# Patient Record
Sex: MALE | Race: OTHER
[De-identification: names, ages, dates, MRNs, and addresses within clinical notes are randomized per-mention and may not be internally consistent; named-entity substitution may affect disease eponyms.]

---

## 2019-12-19 ENCOUNTER — HOSPITAL ENCOUNTER (INPATIENT)
Dept: HOSPITAL 12 - ER | Age: 29
LOS: 1 days | Discharge: HOME | DRG: 253 | End: 2019-12-20
Admitting: INTERNAL MEDICINE
Payer: MEDICAID

## 2019-12-19 VITALS — HEIGHT: 75 IN | WEIGHT: 215.13 LBS | BODY MASS INDEX: 26.75 KG/M2

## 2019-12-19 DIAGNOSIS — K59.00: ICD-10-CM

## 2019-12-19 DIAGNOSIS — K92.2: Primary | ICD-10-CM

## 2019-12-19 DIAGNOSIS — R74.0: ICD-10-CM

## 2019-12-19 DIAGNOSIS — K21.9: ICD-10-CM

## 2019-12-19 LAB
ALP SERPL-CCNC: 113 U/L (ref 50–136)
ALT SERPL W/O P-5'-P-CCNC: 88 U/L (ref 16–63)
AST SERPL-CCNC: 50 U/L (ref 15–37)
BASOPHILS # BLD AUTO: 0.1 K/UL (ref 0–8)
BASOPHILS NFR BLD AUTO: 0.8 % (ref 0–2)
BILIRUB DIRECT SERPL-MCNC: 0.1 MG/DL (ref 0–0.2)
BILIRUB SERPL-MCNC: 0.4 MG/DL (ref 0.2–1)
BUN SERPL-MCNC: 18 MG/DL (ref 7–18)
CHLORIDE SERPL-SCNC: 103 MMOL/L (ref 98–107)
CO2 SERPL-SCNC: 27 MMOL/L (ref 21–32)
CREAT SERPL-MCNC: 1.2 MG/DL (ref 0.6–1.3)
EOSINOPHIL # BLD AUTO: 0.1 K/UL (ref 0–0.7)
EOSINOPHIL NFR BLD AUTO: 1.3 % (ref 0–7)
GLUCOSE SERPL-MCNC: 102 MG/DL (ref 74–106)
HCT VFR BLD AUTO: 45.9 % (ref 36.7–47.1)
HEMOCCULT STL QL: NEGATIVE
HGB BLD-MCNC: 15.3 G/DL (ref 12.5–16.3)
LIPASE SERPL-CCNC: 146 U/L (ref 73–393)
LYMPHOCYTES # BLD AUTO: 2.8 K/UL (ref 20–40)
LYMPHOCYTES NFR BLD AUTO: 43.2 % (ref 20.5–51.5)
MCH RBC QN AUTO: 28.5 UUG (ref 23.8–33.4)
MCHC RBC AUTO-ENTMCNC: 33 G/DL (ref 32.5–36.3)
MCV RBC AUTO: 85.4 FL (ref 73–96.2)
MONOCYTES # BLD AUTO: 0.5 K/UL (ref 2–10)
MONOCYTES NFR BLD AUTO: 8 % (ref 0–11)
NEUTROPHILS # BLD AUTO: 3 K/UL (ref 1.8–8.9)
NEUTROPHILS NFR BLD AUTO: 46.7 % (ref 38.5–71.5)
PLATELET # BLD AUTO: 235 K/UL (ref 152–348)
POTASSIUM SERPL-SCNC: 3.9 MMOL/L (ref 3.5–5.1)
RBC # BLD AUTO: 5.38 MIL/UL (ref 4.06–5.63)
WBC # BLD AUTO: 6.5 K/UL (ref 3.6–10.2)
WS STN SPEC: 7.7 G/DL (ref 6.4–8.2)

## 2019-12-19 PROCEDURE — G0378 HOSPITAL OBSERVATION PER HR: HCPCS

## 2019-12-19 PROCEDURE — C9113 INJ PANTOPRAZOLE SODIUM, VIA: HCPCS

## 2019-12-19 PROCEDURE — A4663 DIALYSIS BLOOD PRESSURE CUFF: HCPCS

## 2019-12-19 NOTE — NUR
Admitted 30 y/o Male under the care of Dr. Mendez. Dx: black stools. Patient is A&Ox4, 
noted ambulatory w/ steady gait. No complaints of n/v. IV on RFA intact and patent. Patient 
on NPO. Admission protocol initiated. Oriented patient to his room and w/ the use of call 
light. Safety measures observed. Call light in reach

## 2019-12-20 VITALS — SYSTOLIC BLOOD PRESSURE: 104 MMHG | DIASTOLIC BLOOD PRESSURE: 63 MMHG

## 2019-12-20 VITALS — SYSTOLIC BLOOD PRESSURE: 118 MMHG | DIASTOLIC BLOOD PRESSURE: 75 MMHG

## 2019-12-20 LAB
ALP SERPL-CCNC: 93 U/L (ref 50–136)
ALT SERPL W/O P-5'-P-CCNC: 86 U/L (ref 16–63)
AST SERPL-CCNC: 50 U/L (ref 15–37)
BASOPHILS # BLD AUTO: 0 K/UL (ref 0–8)
BASOPHILS NFR BLD AUTO: 0.6 % (ref 0–2)
BILIRUB SERPL-MCNC: 0.4 MG/DL (ref 0.2–1)
BUN SERPL-MCNC: 17 MG/DL (ref 7–18)
CHLORIDE SERPL-SCNC: 107 MMOL/L (ref 98–107)
CO2 SERPL-SCNC: 30 MMOL/L (ref 21–32)
CREAT SERPL-MCNC: 1.2 MG/DL (ref 0.6–1.3)
EOSINOPHIL # BLD AUTO: 0.1 K/UL (ref 0–0.7)
EOSINOPHIL NFR BLD AUTO: 1.5 % (ref 0–7)
GLUCOSE SERPL-MCNC: 97 MG/DL (ref 74–106)
HCT VFR BLD AUTO: 43.1 % (ref 36.7–47.1)
HGB BLD-MCNC: 14.1 G/DL (ref 12.5–16.3)
IRON SERPL-MCNC: 58 UG/DL (ref 50–175)
LYMPHOCYTES # BLD AUTO: 2.6 K/UL (ref 20–40)
LYMPHOCYTES NFR BLD AUTO: 46.6 % (ref 20.5–51.5)
MAGNESIUM SERPL-MCNC: 2.1 MG/DL (ref 1.8–2.4)
MCH RBC QN AUTO: 28.8 UUG (ref 23.8–33.4)
MCHC RBC AUTO-ENTMCNC: 33 G/DL (ref 32.5–36.3)
MCV RBC AUTO: 87.8 FL (ref 73–96.2)
MONOCYTES # BLD AUTO: 0.5 K/UL (ref 2–10)
MONOCYTES NFR BLD AUTO: 8.9 % (ref 0–11)
NEUTROPHILS # BLD AUTO: 2.4 K/UL (ref 1.8–8.9)
NEUTROPHILS NFR BLD AUTO: 42.4 % (ref 38.5–71.5)
PHOSPHATE SERPL-MCNC: 3.1 MG/DL (ref 2.5–4.9)
PLATELET # BLD AUTO: 200 K/UL (ref 152–348)
POTASSIUM SERPL-SCNC: 4.5 MMOL/L (ref 3.5–5.1)
RBC # BLD AUTO: 4.91 MIL/UL (ref 4.06–5.63)
WBC # BLD AUTO: 5.6 K/UL (ref 3.6–10.2)
WS STN SPEC: 6.8 G/DL (ref 6.4–8.2)

## 2019-12-20 NOTE — NUR
DISCHARGED PATIENT TO HOME. REVIEWED D/C INSTRUCTIONS WITH PATIENT. PATIENT VERBALLY 
UNDERSTANDS D/C INSTRUCTIONS, PATIENT STATES HE WILL  MEDICATION FROM LOCAL PHARMACY. 
IV REMOVED. PATIENT GOT DRESSED, ARMBAND REMOVED. ESCORTED PATIENT DOWN TO ELEVATOR WITHOUT 
ANY COMPLICATIONS.

## 2019-12-20 NOTE — NUR
Patient slept well. No complaints of pain at this time. No episode of bloody stools this 
shift. IV on RFA intact and patent w/ IVF infusing. All needs attended. Will endorse 
accordingly

## 2019-12-20 NOTE — NUR
RECEIVED PATIENT RESTING IN BED, AWAKE AND ALERT. NO SIGNS OF ACUTE DISTRESS. PATIENT DENIES 
PAIN AND DISCOMFORT. BED IN LOWEST POSITION, SIDE RAILS UP X2, CALL LIGHT WITHIN REACH. WILL 
CONTINUE TO MONITOR.